# Patient Record
Sex: FEMALE | Race: OTHER | NOT HISPANIC OR LATINO
[De-identification: names, ages, dates, MRNs, and addresses within clinical notes are randomized per-mention and may not be internally consistent; named-entity substitution may affect disease eponyms.]

---

## 2021-11-01 ENCOUNTER — LABORATORY RESULT (OUTPATIENT)
Age: 86
End: 2021-11-01

## 2021-11-02 ENCOUNTER — APPOINTMENT (OUTPATIENT)
Dept: CT IMAGING | Facility: IMAGING CENTER | Age: 86
End: 2021-11-02
Payer: MEDICARE

## 2021-11-02 ENCOUNTER — RESULT REVIEW (OUTPATIENT)
Age: 86
End: 2021-11-02

## 2021-11-02 ENCOUNTER — OUTPATIENT (OUTPATIENT)
Dept: OUTPATIENT SERVICES | Facility: HOSPITAL | Age: 86
LOS: 1 days | Discharge: HOME | End: 2021-11-02

## 2021-11-02 ENCOUNTER — OUTPATIENT (OUTPATIENT)
Dept: OUTPATIENT SERVICES | Facility: HOSPITAL | Age: 86
LOS: 1 days | End: 2021-11-02
Payer: MEDICARE

## 2021-11-02 ENCOUNTER — APPOINTMENT (OUTPATIENT)
Dept: MULTI SPECIALTY CLINIC | Facility: CLINIC | Age: 86
End: 2021-11-02
Payer: MEDICARE

## 2021-11-02 ENCOUNTER — APPOINTMENT (OUTPATIENT)
Dept: MULTI SPECIALTY CLINIC | Facility: CLINIC | Age: 86
End: 2021-11-02

## 2021-11-02 ENCOUNTER — APPOINTMENT (OUTPATIENT)
Dept: HEMATOLOGY ONCOLOGY | Facility: CLINIC | Age: 86
End: 2021-11-02

## 2021-11-02 VITALS
TEMPERATURE: 97 F | RESPIRATION RATE: 17 BRPM | BODY MASS INDEX: 23.34 KG/M2 | OXYGEN SATURATION: 98 % | DIASTOLIC BLOOD PRESSURE: 86 MMHG | SYSTOLIC BLOOD PRESSURE: 167 MMHG | HEART RATE: 100 BPM | WEIGHT: 133.38 LBS | HEIGHT: 63.58 IN

## 2021-11-02 DIAGNOSIS — R03.0 ELEVATED BLOOD-PRESSURE READING, W/OUT DIAGNOSIS OF HYPERTENSION: ICD-10-CM

## 2021-11-02 DIAGNOSIS — Z80.52 FAMILY HISTORY OF MALIGNANT NEOPLASM OF BLADDER: ICD-10-CM

## 2021-11-02 DIAGNOSIS — K86.89 OTHER SPECIFIED DISEASES OF PANCREAS: ICD-10-CM

## 2021-11-02 DIAGNOSIS — Z80.2 FAMILY HISTORY OF MALIGNANT NEOPLASM OF OTHER RESPIRATORY AND INTRATHORACIC ORGANS: ICD-10-CM

## 2021-11-02 DIAGNOSIS — C25.9 MALIGNANT NEOPLASM OF PANCREAS, UNSPECIFIED: ICD-10-CM

## 2021-11-02 DIAGNOSIS — Z87.891 PERSONAL HISTORY OF NICOTINE DEPENDENCE: ICD-10-CM

## 2021-11-02 LAB
BASOPHILS # BLD AUTO: 0.04 K/UL — SIGNIFICANT CHANGE UP (ref 0–0.2)
BASOPHILS # BLD AUTO: 0.06 K/UL — SIGNIFICANT CHANGE UP (ref 0–0.2)
BASOPHILS NFR BLD AUTO: 0.4 % — SIGNIFICANT CHANGE UP (ref 0–2)
BASOPHILS NFR BLD AUTO: 0.6 % — SIGNIFICANT CHANGE UP (ref 0–2)
EOSINOPHIL # BLD AUTO: 0.03 K/UL — SIGNIFICANT CHANGE UP (ref 0–0.5)
EOSINOPHIL # BLD AUTO: 0.03 K/UL — SIGNIFICANT CHANGE UP (ref 0–0.5)
EOSINOPHIL NFR BLD AUTO: 0.3 % — SIGNIFICANT CHANGE UP (ref 0–6)
EOSINOPHIL NFR BLD AUTO: 0.3 % — SIGNIFICANT CHANGE UP (ref 0–6)
HCT VFR BLD CALC: 43.3 % — SIGNIFICANT CHANGE UP (ref 34.5–45)
HCT VFR BLD CALC: 45.1 % — HIGH (ref 34.5–45)
HGB BLD-MCNC: 14.7 G/DL — SIGNIFICANT CHANGE UP (ref 11.5–15.5)
HGB BLD-MCNC: 15.4 G/DL — SIGNIFICANT CHANGE UP (ref 11.5–15.5)
IMM GRANULOCYTES NFR BLD AUTO: 0.2 % — SIGNIFICANT CHANGE UP (ref 0–1.5)
IMM GRANULOCYTES NFR BLD AUTO: 0.5 % — SIGNIFICANT CHANGE UP (ref 0–1.5)
LYMPHOCYTES # BLD AUTO: 1.3 K/UL — SIGNIFICANT CHANGE UP (ref 1–3.3)
LYMPHOCYTES # BLD AUTO: 13 % — SIGNIFICANT CHANGE UP (ref 13–44)
LYMPHOCYTES # BLD AUTO: 2.19 K/UL — SIGNIFICANT CHANGE UP (ref 1–3.3)
LYMPHOCYTES # BLD AUTO: 20.8 % — SIGNIFICANT CHANGE UP (ref 13–44)
MCHC RBC-ENTMCNC: 33.5 PG — SIGNIFICANT CHANGE UP (ref 27–34)
MCHC RBC-ENTMCNC: 33.8 PG — SIGNIFICANT CHANGE UP (ref 27–34)
MCHC RBC-ENTMCNC: 33.9 G/DL — SIGNIFICANT CHANGE UP (ref 32–36)
MCHC RBC-ENTMCNC: 34.1 G/DL — SIGNIFICANT CHANGE UP (ref 32–36)
MCV RBC AUTO: 98.6 FL — SIGNIFICANT CHANGE UP (ref 80–100)
MCV RBC AUTO: 98.9 FL — SIGNIFICANT CHANGE UP (ref 80–100)
MONOCYTES # BLD AUTO: 0.69 K/UL — SIGNIFICANT CHANGE UP (ref 0–0.9)
MONOCYTES # BLD AUTO: 1.04 K/UL — HIGH (ref 0–0.9)
MONOCYTES NFR BLD AUTO: 6.9 % — SIGNIFICANT CHANGE UP (ref 2–14)
MONOCYTES NFR BLD AUTO: 9.9 % — SIGNIFICANT CHANGE UP (ref 2–14)
NEUTROPHILS # BLD AUTO: 7.23 K/UL — SIGNIFICANT CHANGE UP (ref 1.8–7.4)
NEUTROPHILS # BLD AUTO: 7.89 K/UL — HIGH (ref 1.8–7.4)
NEUTROPHILS NFR BLD AUTO: 68.4 % — SIGNIFICANT CHANGE UP (ref 43–77)
NEUTROPHILS NFR BLD AUTO: 78.7 % — HIGH (ref 43–77)
NRBC # BLD: 0 /100 WBCS — SIGNIFICANT CHANGE UP (ref 0–0)
NRBC # BLD: 0 /100 WBCS — SIGNIFICANT CHANGE UP (ref 0–0)
PLATELET # BLD AUTO: 259 K/UL — SIGNIFICANT CHANGE UP (ref 150–400)
PLATELET # BLD AUTO: 262 K/UL — SIGNIFICANT CHANGE UP (ref 150–400)
RBC # BLD: 4.39 M/UL — SIGNIFICANT CHANGE UP (ref 3.8–5.2)
RBC # BLD: 4.56 M/UL — SIGNIFICANT CHANGE UP (ref 3.8–5.2)
RBC # FLD: 11.4 % — SIGNIFICANT CHANGE UP (ref 10.3–14.5)
RBC # FLD: 11.5 % — SIGNIFICANT CHANGE UP (ref 10.3–14.5)
WBC # BLD: 10.02 K/UL — SIGNIFICANT CHANGE UP (ref 3.8–10.5)
WBC # BLD: 10.55 K/UL — HIGH (ref 3.8–10.5)
WBC # FLD AUTO: 10.02 K/UL — SIGNIFICANT CHANGE UP (ref 3.8–10.5)
WBC # FLD AUTO: 10.55 K/UL — HIGH (ref 3.8–10.5)

## 2021-11-02 PROCEDURE — 99203 OFFICE O/P NEW LOW 30 MIN: CPT

## 2021-11-02 PROCEDURE — 71260 CT THORAX DX C+: CPT | Mod: MH

## 2021-11-02 PROCEDURE — 74177 CT ABD & PELVIS W/CONTRAST: CPT | Mod: MH

## 2021-11-02 PROCEDURE — 82565 ASSAY OF CREATININE: CPT

## 2021-11-02 PROCEDURE — 71260 CT THORAX DX C+: CPT | Mod: 26,MH

## 2021-11-02 PROCEDURE — 99205 OFFICE O/P NEW HI 60 MIN: CPT

## 2021-11-02 PROCEDURE — 74177 CT ABD & PELVIS W/CONTRAST: CPT | Mod: 26,MH

## 2021-11-02 RX ORDER — TRAMADOL HYDROCHLORIDE 50 MG/1
50 TABLET, COATED ORAL 3 TIMES DAILY
Refills: 0 | Status: ACTIVE | COMMUNITY

## 2021-11-02 NOTE — REASON FOR VISIT
[Initial MDC Visit] : an initial MDC visit for [Pancreatic Cancer] : pancreatic cancer [Initial Consultation] : an initial consultation

## 2021-11-02 NOTE — RESULTS/DATA
[FreeTextEntry1] : 88 y/o woman with radiographic suspicion of metastatic pancreatic cancer presenting today for an initial visit with [facility] oncology.\par \par She does not yet have a biopsy confirming diagnosis but on the CT CAP w contrast performed this morning and reviewed at [facility], she appears to have a mass in the pancreas as well as at least one prominent peritoneal nodule.  has been ordered, not yet available for review. She will need to undergo a biopsy for the lesion for histological confirmation, assessment of molecular targets, and ideally recruitment onto protocol 1606 ([facility] organoids). \par \par She is elderly at 87 although fairly healthy. Prior to a month ago she was participating in twice-weekly exercise sessions at her [facility] and she was walking 2-3 miles every day. She has slowed down somewhat but may still be a candidate for some chemotherapy, probably gemcitabine weekly. Dr Dos Santos from Radiation Oncology also saw the patient today who advised that after some chemotherapy if she is have any symptoms from the primary she may be a candidate from SBRT in the palliative setting. She is having little currently in the way of symptoms, only mild abdominal pain that is managed sufficiently with non-opiates. Lastly, as the patient lives in [place], we can have her evaluated at [facility] in [place], which is considerably closer to where she lives.\par \par Plan:\par - I will order an IR biopsy of peritoneal mass, ideally we can recruit her onto Protocol 1606 for this\par - labs today pending including \par - I will order an Invitae test for her today\par - following biopsy, local oncology can order Foundation One testing on that tissue\par - I will refer to [facility]\par - patient met with Radiation Oncology today, note to follow, may benefit from SBRT if symptoms worsen from the primary\par \par Seb Minor MD PhD\par Medical Oncology Attending\par

## 2021-11-02 NOTE — HISTORY OF PRESENT ILLNESS
[Abdominal Pain] : abdominal pain [Weight loss] : weight loss [Other: ___] : [unfilled] [Not staged outside] : not staged outside [Nutrition] : Nutrition [Social Work] : Social Work [Restricted in physically strenuous activity but ambulatory and able to carry out work of a light or sedentary nature] : Status 1 - Restricted in physically strenuous activity but ambulatory and able to carry out work of a light or sedentary nature, e.g., light house work, office work [TextBox_4] : 10/20/21 [TextBox_41] : N/A at this time  [] : This is not a second opinion [TextBox_5] : 11/2/21 [TextBox_40] : 11/2/21 [de-identified] : 88 y/o woman with radiographic evidence of pancreatic cancer presenting for an initial visit.\par \par Chronologic History of Cancer:\par Sept 2021 begin having epigastric, progressive, some weight loss\par 10/20/21 CT AP (non con) for abdominal pain showed a pancreatic body lesion measuring 2 x 1 cm with infiltration of the surrounding peripancreatic soft tissues. \par 11/02/21  CT CAP with IV contrast and tumor marker labs today. 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 Was going to [place] for a senior exercise class, was walking 1-2 miles a day. In the last week has been less active. Enjoys reading the daily paper.\par Family cancer hx:  Brother (bladder ca), Brother (larynx ca, +smoker). \par

## 2021-11-03 ENCOUNTER — NON-APPOINTMENT (OUTPATIENT)
Age: 86
End: 2021-11-03

## 2021-11-11 LAB
ALBUMIN SERPL ELPH-MCNC: 4.7 G/DL
ALP BLD-CCNC: 49 U/L
ALT SERPL-CCNC: 12 U/L
AMYLASE/CREAT SERPL: 17 U/L
ANION GAP SERPL CALC-SCNC: 20 MMOL/L
AST SERPL-CCNC: 21 U/L
BILIRUB SERPL-MCNC: 0.4 MG/DL
BUN SERPL-MCNC: 11 MG/DL
CALCIUM SERPL-MCNC: 10.2 MG/DL
CANCER AG19-9 SERPL-ACNC: 2931 U/ML
CEA SERPL-MCNC: 6.4 NG/ML
CHLORIDE SERPL-SCNC: 93 MMOL/L
CO2 SERPL-SCNC: 20 MMOL/L
CREAT SERPL-MCNC: 0.92 MG/DL
GLUCOSE SERPL-MCNC: 102 MG/DL
LPL SERPL-CCNC: 25 U/L
POTASSIUM SERPL-SCNC: 4.6 MMOL/L
PROT SERPL-MCNC: 7.4 G/DL
SODIUM SERPL-SCNC: 132 MMOL/L

## 2021-11-16 ENCOUNTER — APPOINTMENT (OUTPATIENT)
Dept: DISASTER EMERGENCY | Facility: CLINIC | Age: 86
End: 2021-11-16

## 2021-11-16 NOTE — HISTORY OF PRESENT ILLNESS
[de-identified] : Ms. BREANNA DENNIS is a 87 year old female who presents today for an initial consultation to [facility]. Hx of borderline HTN (not on meds) and otherwise no significant PMH/ PSH. She reports having epigastric abdominal pain for a few months, which then became constant. She recently went to her PCP for evaluation, and CT abdomen/pelvis (noncontrast) was performed on 10/20/21 with findings noting abnormal density with suggestion of a lesion within the pancreatic body measuring 2 x 1 cm with infiltration of the surrounding peripancreatic soft tissues. Bloodwork on 10/21/21 with normal LFTs. HgA1c 5.7%. Tumor markers are pending. Patient was referred for further evaluation after CT results, and is undergoing a CT chest/ abdomen/ pelvis with IV contrast and tumor marker labs today. \par She reports decreased appetite and subsequent weight loss of unknown amount. Denies nausea or vomiting. She is taking tramadol PRN for the abdominal pain. Reports constipation and denies any steatorrhea. Last colonoscopy at least 10 years ago. She is ambulatory and can perform her ADLs but has increased fatigue lately. \par \par Family cancer hx: Brother (bladder ca), Brother (larynx ca, +smoker).

## 2021-11-16 NOTE — ASSESSMENT
[FreeTextEntry1] : Ms. BREANNA DENNIS is a 87 year old female who presents today for an initial consultation to [facility]. Hx of borderline HTN (not on meds) and otherwise no significant PMH/ PSH. She reports having epigastric abdominal pain for a few months, which then became constant.  I reviewed the CT from today that demonstrates a pancreatic body mass measuring 4cm with infiltration of the surrounding peripancreatic soft tissues and encasement of vessel and unfortunately what looks to be peritoneal mets.  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 Would recommend tissue bx of this and discussed systemic therapy as well as potential for clinical trials. She will see a medical oncologist next to discuss specifics. \par \par Today, I personally spent 45 minutes in total time including reviewing imaging and studies, discussing complex treatment regimens, direct face to face time with the patient, patient education and counseling.\par

## 2021-11-18 NOTE — HISTORY OF PRESENT ILLNESS
[Abdominal Pain] : abdominal pain [Weight loss] : weight loss [Other: ___] : [unfilled] [Not staged outside] : not staged outside [Nutrition] : Nutrition [Social Work] : Social Work [Restricted in physically strenuous activity but ambulatory and able to carry out work of a light or sedentary nature] : Status 1 - Restricted in physically strenuous activity but ambulatory and able to carry out work of a light or sedentary nature, e.g., light house work, office work [Other: ____] : [unfilled] [Metastatic] : metastatic [SMA] : SMA [More than 180 (encasement)] : more than 180 (encasement) [body] : body [Clinical trial] : clinical trial [Control of disease progression (no chance of resection)] : control of disease progression (no chance of resection) [de-identified] : This is a non-billable note*\par \par \par Ms. BREANNA DENNIS is a 87 year old female who presents for evaluation in our [facility].  Hx of borderline HTN (not on meds) and otherwise no significant PMH/ PSH.  She reports having epigastric abdominal pain for a few months, which then became constant.  She recently went to her PCP for evaluation, and CT abdomen/pelvis (noncontrast) was performed on 10/20/21 with findings noting abnormal density with suggestion of a lesion within the pancreatic body measuring 2 x 1 cm with infiltration of the surrounding peripancreatic soft tissues.  Bloodwork on 10/21/21 with normal LFTs. HgA1c 5.7%.  Tumor markers are pending.   Patient was referred for further evaluation after CT results, and is undergoing a CT chest/ abdomen/ pelvis with IV contrast and tumor marker labs today. \par She reports decreased appetite and subsequent weight loss of unknown amount. Denies nausea or vomiting. She is taking tramadol PRN for the abdominal pain.  Reports constipation and denies any steatorrhea.  Last colonoscopy at least 10 years ago.  She is ambulatory and can perform her ADLs but has increased fatigue lately.  \par \par Family cancer hx:  Brother (bladder ca), Brother (larynx ca, +smoker). \par  [TextBox_4] : 10/20/21 [TextBox_41] : N/A at this time  [] : no [TextBox_5] : 11/2/21 [TextBox_38] : 4494 [FreeTextEntry3] : 6.4  [FreeTextEntry4] : 0.4 [TextBox_40] : 11/2/21 [FreeTextEntry6] : Might consider GIANT study \par Consider palliative RT

## 2021-11-18 NOTE — ASSESSMENT
[FreeTextEntry1] : 11/2/21   Surg/Onc Dr Castle\par \par Ms. BREANNA DENNIS is a 87 year old female who presents today for an initial consultation to [facility]. Hx of borderline HTN (not on meds) and otherwise no significant PMH/ PSH. She reports having epigastric abdominal pain for a few months, which then became constant. I reviewed the CT from today that demonstrates a pancreatic body mass measuring 4cm with infiltration of the surrounding peripancreatic soft tissues and encasement of vessel and unfortunately what looks to be peritoneal mets. 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 Would recommend tissue bx of this and discussed systemic therapy as well as potential for clinical trials. She will see a medical oncologist next to discuss specifics. \par \par Today, I personally spent 45 minutes in total time including reviewing imaging and studies, discussing complex treatment regimens, direct face to face time with the patient, patient education and counseling.\par \par \par \par 11/2/21   Med/Onc  Dr Minor \par \par \par 86 y/o woman with radiographic suspicion of metastatic pancreatic cancer presenting today for an initial visit with [facility] oncology.\par \par She does not yet have a biopsy confirming diagnosis but on the CT CAP w contrast performed this morning and reviewed at [facility], she appears to have a mass in the pancreas as well as at least one prominent peritoneal nodule.  has been ordered, not yet available for review. She will need to undergo a biopsy for the lesion for histological confirmation, assessment of molecular targets, and ideally recruitment onto protocol 1606 ([facility] organoids). \par \par She is elderly at 87 although fairly healthy. Prior to a month ago she was participating in twice-weekly exercise sessions at her [facility] [place] [facility] and she was walking 2-3 miles every day. She has slowed down somewhat but may still be a candidate for some chemotherapy, probably gemcitabine weekly. Dr Dos Santos from Radiation Oncology also saw the patient today who advised that after some chemotherapy if she is have any symptoms from the primary she may be a candidate from SBRT in the palliative setting. She is having little currently in the way of symptoms, only mild abdominal pain that is managed sufficiently with non-opiates. Lastly, as the patient lives in [place], we can have her evaluated at [facility] in [place], which is considerably closer to where she lives.\par \par Plan:\par - I will order an IR biopsy of peritoneal mass, ideally we can recruit her onto Protocol 1606 for this\par - labs today pending including \par - I will order an Invitae test for her today\par - following biopsy, local oncology can order Foundation One testing on that tissue\par - I will refer to [facility] oncology\par - patient met with Radiation Oncology today, note to follow, may benefit from SBRT if symptoms worsen from the primary\par \par

## 2021-11-23 ENCOUNTER — OUTPATIENT (OUTPATIENT)
Dept: OUTPATIENT SERVICES | Facility: HOSPITAL | Age: 86
LOS: 1 days | Discharge: HOME | End: 2021-11-23

## 2021-11-23 DIAGNOSIS — C25.9 MALIGNANT NEOPLASM OF PANCREAS, UNSPECIFIED: ICD-10-CM

## 2021-11-26 ENCOUNTER — APPOINTMENT (OUTPATIENT)
Dept: HEMATOLOGY ONCOLOGY | Facility: CLINIC | Age: 86
End: 2021-11-26

## 2021-12-02 ENCOUNTER — APPOINTMENT (OUTPATIENT)
Dept: HEMATOLOGY ONCOLOGY | Facility: CLINIC | Age: 86
End: 2021-12-02

## 2021-12-03 ENCOUNTER — APPOINTMENT (OUTPATIENT)
Dept: RADIATION ONCOLOGY | Facility: CLINIC | Age: 86
End: 2021-12-03

## 2021-12-06 LAB
MISCELLANEOUS TEST: NORMAL
PROC NAME: NORMAL

## 2023-10-01 PROBLEM — K86.89 PANCREATIC MASS: Status: ACTIVE | Noted: 2021-11-01
